# Patient Record
Sex: MALE | Race: WHITE | URBAN - METROPOLITAN AREA
[De-identification: names, ages, dates, MRNs, and addresses within clinical notes are randomized per-mention and may not be internally consistent; named-entity substitution may affect disease eponyms.]

---

## 2024-10-18 ENCOUNTER — TELEPHONE (OUTPATIENT)
Dept: FAMILY MEDICINE CLINIC | Age: 74
End: 2024-10-18

## 2024-10-18 NOTE — TELEPHONE ENCOUNTER
----- Message from Teri CASH sent at 10/17/2024  4:33 PM EDT -----  Regarding: [facility] Appointment Request  [facility] Appointment Request    Patient needs appointment for [facility] Appointment Type: New to Provider.    Patient Requested Dates(s): October 28, 2024  Patient Requested Time: 2:00 pm  Provider Name:Marques Parra MD      Note: The patient wants to have an establish care as well as a new to provider appointment on October 28, 2024 at 2 pm because his wife will be having an appointment same day at 1 pm.      Reason for Appointment Request: Established Patient - Available appointments did not meet patient need  --------------------------------------------------------------------------------------------------------------------------    Relationship to Patient: Spouse/Partner wife      Call Back Information: OK to leave message on voicemail or text message   Preferred Call Back Number: Phone [phone]

## 2024-10-28 ENCOUNTER — OFFICE VISIT (OUTPATIENT)
Dept: FAMILY MEDICINE CLINIC | Age: 74
End: 2024-10-28
Payer: MEDICARE

## 2024-10-28 VITALS
BODY MASS INDEX: 30.66 KG/M2 | RESPIRATION RATE: 16 BRPM | OXYGEN SATURATION: 95 % | HEIGHT: 67 IN | WEIGHT: 195.38 LBS | TEMPERATURE: 97 F | HEART RATE: 90 BPM | DIASTOLIC BLOOD PRESSURE: 84 MMHG | SYSTOLIC BLOOD PRESSURE: 127 MMHG

## 2024-10-28 DIAGNOSIS — G43.909 MIGRAINE WITHOUT STATUS MIGRAINOSUS, NOT INTRACTABLE, UNSPECIFIED MIGRAINE TYPE: Primary | ICD-10-CM

## 2024-10-28 DIAGNOSIS — K27.9 PUD (PEPTIC ULCER DISEASE): ICD-10-CM

## 2024-10-28 DIAGNOSIS — C61 PROSTATE CANCER (HCC): ICD-10-CM

## 2024-10-28 DIAGNOSIS — Z91.81 AT HIGH RISK FOR FALLS: ICD-10-CM

## 2024-10-28 DIAGNOSIS — Z86.79 HISTORY OF CAD (CORONARY ARTERY DISEASE): ICD-10-CM

## 2024-10-28 DIAGNOSIS — G20.A1 PARKINSON'S DISEASE WITHOUT DYSKINESIA OR FLUCTUATING MANIFESTATIONS (HCC): ICD-10-CM

## 2024-10-28 PROCEDURE — G8484 FLU IMMUNIZE NO ADMIN: HCPCS | Performed by: FAMILY MEDICINE

## 2024-10-28 PROCEDURE — 1159F MED LIST DOCD IN RCRD: CPT | Performed by: FAMILY MEDICINE

## 2024-10-28 PROCEDURE — G8417 CALC BMI ABV UP PARAM F/U: HCPCS | Performed by: FAMILY MEDICINE

## 2024-10-28 PROCEDURE — G8427 DOCREV CUR MEDS BY ELIG CLIN: HCPCS | Performed by: FAMILY MEDICINE

## 2024-10-28 PROCEDURE — 99204 OFFICE O/P NEW MOD 45 MIN: CPT | Performed by: FAMILY MEDICINE

## 2024-10-28 PROCEDURE — 1036F TOBACCO NON-USER: CPT | Performed by: FAMILY MEDICINE

## 2024-10-28 PROCEDURE — 36415 COLL VENOUS BLD VENIPUNCTURE: CPT | Performed by: FAMILY MEDICINE

## 2024-10-28 PROCEDURE — 1126F AMNT PAIN NOTED NONE PRSNT: CPT | Performed by: FAMILY MEDICINE

## 2024-10-28 PROCEDURE — 1123F ACP DISCUSS/DSCN MKR DOCD: CPT | Performed by: FAMILY MEDICINE

## 2024-10-28 RX ORDER — FAMOTIDINE 20 MG/1
1 TABLET, FILM COATED ORAL EVERY 12 HOURS
COMMUNITY

## 2024-10-28 RX ORDER — ASPIRIN 81 MG/1
81 TABLET, CHEWABLE ORAL DAILY
COMMUNITY

## 2024-10-28 RX ORDER — CARBIDOPA AND LEVODOPA 50; 200 MG/1; MG/1
TABLET, EXTENDED RELEASE ORAL
COMMUNITY

## 2024-10-28 RX ORDER — ATORVASTATIN CALCIUM 40 MG/1
1 TABLET, FILM COATED ORAL DAILY
COMMUNITY
Start: 2024-10-15

## 2024-10-28 RX ORDER — SILDENAFIL CITRATE 20 MG/1
TABLET ORAL
COMMUNITY
Start: 2024-08-12

## 2024-10-28 RX ORDER — GLUCOSAMINE/CHONDR SU A SOD 750-600 MG
TABLET ORAL
COMMUNITY

## 2024-10-28 RX ORDER — NITROGLYCERIN 0.4 MG/1
TABLET SUBLINGUAL
COMMUNITY
Start: 2023-11-20

## 2024-10-28 RX ORDER — CARBIDOPA AND LEVODOPA 25; 100 MG/1; MG/1
1 TABLET ORAL
COMMUNITY
Start: 2024-04-30

## 2024-10-28 SDOH — ECONOMIC STABILITY: FOOD INSECURITY: WITHIN THE PAST 12 MONTHS, THE FOOD YOU BOUGHT JUST DIDN'T LAST AND YOU DIDN'T HAVE MONEY TO GET MORE.: NEVER TRUE

## 2024-10-28 SDOH — ECONOMIC STABILITY: FOOD INSECURITY: WITHIN THE PAST 12 MONTHS, YOU WORRIED THAT YOUR FOOD WOULD RUN OUT BEFORE YOU GOT MONEY TO BUY MORE.: NEVER TRUE

## 2024-10-28 SDOH — ECONOMIC STABILITY: INCOME INSECURITY: HOW HARD IS IT FOR YOU TO PAY FOR THE VERY BASICS LIKE FOOD, HOUSING, MEDICAL CARE, AND HEATING?: NOT HARD AT ALL

## 2024-10-28 ASSESSMENT — PATIENT HEALTH QUESTIONNAIRE - PHQ9
2. FEELING DOWN, DEPRESSED OR HOPELESS: NOT AT ALL
SUM OF ALL RESPONSES TO PHQ9 QUESTIONS 1 & 2: 0
SUM OF ALL RESPONSES TO PHQ QUESTIONS 1-9: 0
1. LITTLE INTEREST OR PLEASURE IN DOING THINGS: NOT AT ALL
SUM OF ALL RESPONSES TO PHQ QUESTIONS 1-9: 0

## 2024-10-28 NOTE — PROGRESS NOTES
\"Have you been to the ER, urgent care clinic since your last visit?  Hospitalized since your last visit?\"     No    “Have you seen or consulted any other health care providers outside our system since your last visit?”     Yes - 9/18/24 - [facility]     Successful venipuncture in patient's Right AC X 1 sticks.  The patient tolerated this procedure w/o c/o pain or discomfort.       10/28/2024      Chief Complaint   Patient presents with    New Patient         History of Present Illness:         is a 74 y.o. male establishing care after many years with Dr. Arevalo. Parkinson's diagnosed about three years ago, followed at [facility]. Sxs are mostly non motor, with cognitive slowing, concentration and word finding difficulty, balance issues with a couple of recent falls, constipation, and vivid dreams. Also having episodes of hypotension. Prostate cancer surgery for cure in 2008, PSA levels remain undetectable. Overall he feels well, has been able to continue to travel.      No Known Allergies    Current Outpatient Medications   Medication Sig Dispense Refill    atorvastatin (LIPITOR) 40 MG tablet Take 1 tablet by mouth daily      carbidopa-levodopa (SINEMET)  MG per tablet 1 tablet      carbidopa-levodopa (SINEMET CR)  MG per extended release tablet TAKE 1 TABLET BY MOUTH AT BEDTIME. DO NOT CRUSH OR CHEW      famotidine (PEPCID) 20 MG tablet Take 1 tablet by mouth in the morning and 1 tablet in the evening.      nitroGLYCERIN (NITROSTAT) 0.4 MG SL tablet Place under the tongue      Lutein-Zeaxanthin 25-5 MG CAPS       sildenafil (REVATIO) 20 MG tablet TAKE 1 TABLET BY MOUTH ONCE DAILY AS NEEDED      aspirin 81 MG chewable tablet Take 1 tablet by mouth daily       No current facility-administered medications for this visit.             Physical Examination:    /84 (Site: Left Upper Arm, Position: Sitting, Cuff Size: Medium Adult)   Pulse 90   Temp 97 °F (36.1 °C) (Oral)

## 2024-10-29 LAB
ALBUMIN SERPL-MCNC: 3.8 G/DL (ref 3.5–5)
ALBUMIN/GLOB SERPL: 1.3 (ref 1.1–2.2)
ALP SERPL-CCNC: 60 U/L (ref 45–117)
ALT SERPL-CCNC: 30 U/L (ref 12–78)
ANION GAP SERPL CALC-SCNC: 5 MMOL/L (ref 2–12)
AST SERPL-CCNC: 19 U/L (ref 15–37)
BASOPHILS # BLD: 0 K/UL (ref 0–0.1)
BASOPHILS NFR BLD: 1 % (ref 0–1)
BILIRUB SERPL-MCNC: 0.5 MG/DL (ref 0.2–1)
BUN SERPL-MCNC: 16 MG/DL (ref 6–20)
BUN/CREAT SERPL: 15 (ref 12–20)
CALCIUM SERPL-MCNC: 9 MG/DL (ref 8.5–10.1)
CHLORIDE SERPL-SCNC: 105 MMOL/L (ref 97–108)
CO2 SERPL-SCNC: 30 MMOL/L (ref 21–32)
CREAT SERPL-MCNC: 1.1 MG/DL (ref 0.7–1.3)
DIFFERENTIAL METHOD BLD: NORMAL
EOSINOPHIL # BLD: 0.2 K/UL (ref 0–0.4)
EOSINOPHIL NFR BLD: 4 % (ref 0–7)
ERYTHROCYTE [DISTWIDTH] IN BLOOD BY AUTOMATED COUNT: 13.2 % (ref 11.5–14.5)
GLOBULIN SER CALC-MCNC: 3 G/DL (ref 2–4)
GLUCOSE SERPL-MCNC: 106 MG/DL (ref 65–100)
HCT VFR BLD AUTO: 40.6 % (ref 36.6–50.3)
HGB BLD-MCNC: 13.1 G/DL (ref 12.1–17)
IMM GRANULOCYTES # BLD AUTO: 0 K/UL (ref 0–0.04)
IMM GRANULOCYTES NFR BLD AUTO: 0 % (ref 0–0.5)
LYMPHOCYTES # BLD: 1.9 K/UL (ref 0.8–3.5)
LYMPHOCYTES NFR BLD: 33 % (ref 12–49)
MCH RBC QN AUTO: 31.2 PG (ref 26–34)
MCHC RBC AUTO-ENTMCNC: 32.3 G/DL (ref 30–36.5)
MCV RBC AUTO: 96.7 FL (ref 80–99)
MONOCYTES # BLD: 0.5 K/UL (ref 0–1)
MONOCYTES NFR BLD: 9 % (ref 5–13)
NEUTS SEG # BLD: 3.1 K/UL (ref 1.8–8)
NEUTS SEG NFR BLD: 53 % (ref 32–75)
NRBC # BLD: 0 K/UL (ref 0–0.01)
NRBC BLD-RTO: 0 PER 100 WBC
PLATELET # BLD AUTO: 267 K/UL (ref 150–400)
PMV BLD AUTO: 11.4 FL (ref 8.9–12.9)
POTASSIUM SERPL-SCNC: 4.2 MMOL/L (ref 3.5–5.1)
PROT SERPL-MCNC: 6.8 G/DL (ref 6.4–8.2)
RBC # BLD AUTO: 4.2 M/UL (ref 4.1–5.7)
SODIUM SERPL-SCNC: 140 MMOL/L (ref 136–145)
WBC # BLD AUTO: 5.7 K/UL (ref 4.1–11.1)

## 2024-10-30 LAB
PSA FREE MFR SERPL: NORMAL %
PSA FREE SERPL-MCNC: <0.02 NG/ML
PSA SERPL-MCNC: <0.1 NG/ML (ref 0–4)

## 2025-05-16 ENCOUNTER — OFFICE VISIT (OUTPATIENT)
Dept: FAMILY MEDICINE CLINIC | Age: 75
End: 2025-05-16

## 2025-05-16 VITALS
RESPIRATION RATE: 16 BRPM | WEIGHT: 198 LBS | HEART RATE: 78 BPM | DIASTOLIC BLOOD PRESSURE: 79 MMHG | HEIGHT: 67 IN | BODY MASS INDEX: 31.08 KG/M2 | TEMPERATURE: 97 F | SYSTOLIC BLOOD PRESSURE: 120 MMHG | OXYGEN SATURATION: 94 %

## 2025-05-16 DIAGNOSIS — Z00.00 MEDICARE ANNUAL WELLNESS VISIT, SUBSEQUENT: Primary | ICD-10-CM

## 2025-05-16 DIAGNOSIS — E78.2 MIXED HYPERLIPIDEMIA: ICD-10-CM

## 2025-05-16 SDOH — ECONOMIC STABILITY: FOOD INSECURITY: WITHIN THE PAST 12 MONTHS, THE FOOD YOU BOUGHT JUST DIDN'T LAST AND YOU DIDN'T HAVE MONEY TO GET MORE.: NEVER TRUE

## 2025-05-16 SDOH — ECONOMIC STABILITY: FOOD INSECURITY: WITHIN THE PAST 12 MONTHS, YOU WORRIED THAT YOUR FOOD WOULD RUN OUT BEFORE YOU GOT MONEY TO BUY MORE.: NEVER TRUE

## 2025-05-16 ASSESSMENT — PATIENT HEALTH QUESTIONNAIRE - PHQ9
2. FEELING DOWN, DEPRESSED OR HOPELESS: NOT AT ALL
SUM OF ALL RESPONSES TO PHQ QUESTIONS 1-9: 0
1. LITTLE INTEREST OR PLEASURE IN DOING THINGS: NOT AT ALL
SUM OF ALL RESPONSES TO PHQ QUESTIONS 1-9: 0

## 2025-05-16 ASSESSMENT — LIFESTYLE VARIABLES
HOW OFTEN DO YOU HAVE A DRINK CONTAINING ALCOHOL: MONTHLY OR LESS
HOW MANY STANDARD DRINKS CONTAINING ALCOHOL DO YOU HAVE ON A TYPICAL DAY: 1 OR 2

## 2025-05-16 NOTE — PROGRESS NOTES
5/16/2025      Chief Complaint   Patient presents with    6 Month Follow-Up    Medicare AWV         History of Present Illness:         is a 74 y.o. male returns feeling well. No falls, Saw neurology back in March, no changes in Parkinson's meds. Does have some nocturnal leg cramps. Due for lipid panel.      No Known Allergies    Current Outpatient Medications   Medication Sig Dispense Refill    atorvastatin (LIPITOR) 40 MG tablet Take 1 tablet by mouth daily      carbidopa-levodopa (SINEMET)  MG per tablet 1 tablet      carbidopa-levodopa (SINEMET CR)  MG per extended release tablet TAKE 1 TABLET BY MOUTH AT BEDTIME. DO NOT CRUSH OR CHEW      famotidine (PEPCID) 20 MG tablet Take 1 tablet by mouth in the morning and 1 tablet in the evening.      nitroGLYCERIN (NITROSTAT) 0.4 MG SL tablet Place under the tongue      Lutein-Zeaxanthin 25-5 MG CAPS       sildenafil (REVATIO) 20 MG tablet TAKE 1 TABLET BY MOUTH ONCE DAILY AS NEEDED      aspirin 81 MG chewable tablet Take 1 tablet by mouth daily       No current facility-administered medications for this visit.             Physical Examination:    /79 (BP Site: Left Upper Arm, Patient Position: Sitting, BP Cuff Size: Medium Adult)   Pulse 78   Temp 97 °F (36.1 °C) (Oral)   Resp 16   Ht 1.702 m (5' 7\")   Wt 89.8 kg (198 lb)   SpO2 94%   BMI 31.01 kg/m²    General:  Alert, cooperative, no distress.   HEENT:  Normocephalic, without obvious abnormality, atraumatic.Conjunctivae/corneas clear. Pupils equal, round, reactive to light. Extraocular movements intact.TMs and external canals normal bilaterally. Nasal mucosa and oropharynx clear.   Lungs: Clear to auscultation bilaterally.   Chest wall:  No tenderness or deformity.   Heart:  Regular rate and rhythm, S1, S2 normal, no murmur, click, rub, or gallop.   Abdomen:   Soft, non-tender. Bowel sounds normal. No masses. No organomegaly.   Extremities: Extremities normal, atraumatic, no cyanosis 
Have you been to the ER, urgent care clinic since your last visit?  Hospitalized since your last visit?   No    Have you seen or consulted any other health care providers outside our system since your last visit?   Yes - 3/18/25 - [facility]       Successful venipuncture in patient's Right AC X 1 sticks.  The patient tolerated this procedure w/o c/o pain or discomfort.         
TAKE 1 TABLET BY MOUTH ONCE DAILY AS NEEDED Yes Provider, MD John   aspirin 81 MG chewable tablet Take 1 tablet by mouth daily Yes Provider, MD John       CareTeam (Including outside providers/suppliers regularly involved in providing care):   Patient Care Team:  Marques Parra MD as PCP - General (Family Medicine)  Marques Parra MD as PCP - Empaneled Provider     Recommendations for Preventive Services Due: see orders and patient instructions/AVS.  Recommended screening schedule for the next 5-10 years is provided to the patient in written form: see Patient Instructions/AVS.     Reviewed and updated this visit:  Allergies  Meds  Problems  Sexual Hx      Sexual History: Patient declined to answer.

## 2025-05-17 LAB
CHOLEST SERPL-MCNC: 155 MG/DL
HDLC SERPL-MCNC: 68 MG/DL
HDLC SERPL: 2.3 (ref 0–5)
LDLC SERPL CALC-MCNC: 74.4 MG/DL (ref 0–100)
TRIGL SERPL-MCNC: 63 MG/DL
VLDLC SERPL CALC-MCNC: 12.6 MG/DL

## 2025-05-18 ENCOUNTER — RESULTS FOLLOW-UP (OUTPATIENT)
Dept: FAMILY MEDICINE CLINIC | Age: 75
End: 2025-05-18